# Patient Record
Sex: FEMALE | Race: WHITE | NOT HISPANIC OR LATINO | RURAL
[De-identification: names, ages, dates, MRNs, and addresses within clinical notes are randomized per-mention and may not be internally consistent; named-entity substitution may affect disease eponyms.]

---

## 2021-11-23 ENCOUNTER — OFFICE VISIT (OUTPATIENT)
Dept: RURAL CLINIC 6 | Facility: CLINIC | Age: 66
End: 2021-11-23
Payer: MEDICARE

## 2021-11-23 ENCOUNTER — WEB ENCOUNTER (OUTPATIENT)
Dept: RURAL CLINIC 6 | Facility: CLINIC | Age: 66
End: 2021-11-23

## 2021-11-23 DIAGNOSIS — Z80.0 FAMILY HISTORY OF COLON CANCER: ICD-10-CM

## 2021-11-23 PROCEDURE — 993 AGA: Performed by: INTERNAL MEDICINE

## 2021-11-23 RX ORDER — MONTELUKAST 10 MG/1
1 TABLET TABLET, FILM COATED ORAL ONCE A DAY
Status: ACTIVE | COMMUNITY

## 2021-11-23 RX ORDER — ZOLPIDEM TARTRATE 5 MG/1
1 TABLET AT BEDTIME TABLET, FILM COATED ORAL ONCE A DAY
Status: ACTIVE | COMMUNITY

## 2021-11-23 RX ORDER — ROSUVASTATIN CALCIUM 40 MG/1
1 TABLET TABLET, FILM COATED ORAL ONCE A DAY
Status: ACTIVE | COMMUNITY

## 2021-11-23 RX ORDER — ICOSAPENT ETHYL 500 MG/1
4 CAPSULES WITH MEALS CAPSULE ORAL TWICE A DAY
Status: ACTIVE | COMMUNITY

## 2021-11-23 RX ORDER — ESZOPICLONE 3 MG/1
1 TABLET IMMEDIATELY BEFORE BEDTIME TABLET, FILM COATED ORAL ONCE A DAY
Status: ACTIVE | COMMUNITY

## 2021-11-23 RX ORDER — CITALOPRAM HYDROBROMIDE 10 MG/1
1 TABLET TABLET, FILM COATED ORAL ONCE A DAY
Status: ACTIVE | COMMUNITY

## 2021-11-23 RX ORDER — HYDROCHLOROTHIAZIDE 12.5 MG/1
1 CAPSULE IN THE MORNING CAPSULE ORAL ONCE A DAY
Status: ACTIVE | COMMUNITY

## 2021-11-23 RX ORDER — SODIUM SULFATE, MAGNESIUM SULFATE, AND POTASSIUM CHLORIDE 17.75; 2.7; 2.25 G/1; G/1; G/1
12 TABLETS TABLET ORAL
Qty: 24 TABLETS | Refills: 0 | OUTPATIENT
Start: 2021-11-23 | End: 2021-11-24

## 2021-11-23 NOTE — HPI-TODAY'S VISIT:
66-year-old female with a positive family history of colon cancer (sister in her 50s) who has had several succesive negative colonoscopy over the years and is due for another.  She has no ongoing GI symptoms.  She has occasional heartburn on a PPI.  She has no dysphagia.  She has no cardiac or respiratory problems.

## 2021-12-22 PROBLEM — 312824007: Status: ACTIVE | Noted: 2021-12-22

## 2022-02-08 ENCOUNTER — OFFICE VISIT (OUTPATIENT)
Dept: RURAL MEDICAL CENTER 3 | Facility: MEDICAL CENTER | Age: 67
End: 2022-02-08
Payer: MEDICARE

## 2022-02-08 DIAGNOSIS — Z80.0 BROTHER AT YOUNG AGE FAMILY HISTORY OF COLON CANCER: ICD-10-CM

## 2022-02-08 DIAGNOSIS — K63.5 BENIGN COLON POLYP: ICD-10-CM

## 2022-02-08 PROCEDURE — 45385 COLONOSCOPY W/LESION REMOVAL: CPT | Performed by: INTERNAL MEDICINE

## 2022-07-28 ENCOUNTER — OFFICE VISIT (OUTPATIENT)
Dept: URBAN - METROPOLITAN AREA CLINIC 54 | Facility: CLINIC | Age: 67
End: 2022-07-28

## 2022-07-28 RX ORDER — ESZOPICLONE 3 MG/1
1 TABLET IMMEDIATELY BEFORE BEDTIME TABLET, FILM COATED ORAL ONCE A DAY
COMMUNITY

## 2022-07-28 RX ORDER — HYDROCHLOROTHIAZIDE 12.5 MG/1
1 CAPSULE IN THE MORNING CAPSULE ORAL ONCE A DAY
COMMUNITY

## 2022-07-28 RX ORDER — ZOLPIDEM TARTRATE 5 MG/1
1 TABLET AT BEDTIME TABLET, FILM COATED ORAL ONCE A DAY
COMMUNITY

## 2022-07-28 RX ORDER — ROSUVASTATIN CALCIUM 40 MG/1
1 TABLET TABLET, FILM COATED ORAL ONCE A DAY
COMMUNITY

## 2022-07-28 RX ORDER — CITALOPRAM HYDROBROMIDE 10 MG/1
1 TABLET TABLET, FILM COATED ORAL ONCE A DAY
COMMUNITY

## 2022-07-28 RX ORDER — ICOSAPENT ETHYL 500 MG/1
4 CAPSULES WITH MEALS CAPSULE ORAL TWICE A DAY
COMMUNITY

## 2022-07-28 RX ORDER — MONTELUKAST 10 MG/1
1 TABLET TABLET, FILM COATED ORAL ONCE A DAY
COMMUNITY

## 2022-07-29 ENCOUNTER — DASHBOARD ENCOUNTERS (OUTPATIENT)
Age: 67
End: 2022-07-29

## 2022-07-29 ENCOUNTER — OFFICE VISIT (OUTPATIENT)
Dept: URBAN - METROPOLITAN AREA CLINIC 54 | Facility: CLINIC | Age: 67
End: 2022-07-29
Payer: MEDICARE

## 2022-07-29 VITALS
BODY MASS INDEX: 27.23 KG/M2 | HEIGHT: 66 IN | SYSTOLIC BLOOD PRESSURE: 130 MMHG | HEART RATE: 71 BPM | WEIGHT: 169.4 LBS | TEMPERATURE: 97.3 F | DIASTOLIC BLOOD PRESSURE: 72 MMHG

## 2022-07-29 DIAGNOSIS — K21.9 GASTROESOPHAGEAL REFLUX DISEASE, UNSPECIFIED WHETHER ESOPHAGITIS PRESENT: ICD-10-CM

## 2022-07-29 PROCEDURE — 99214 OFFICE O/P EST MOD 30 MIN: CPT

## 2022-07-29 RX ORDER — ESTRADIOL 0.1 MG/G
AS DIRECTED CREAM VAGINAL
Status: ACTIVE | COMMUNITY

## 2022-07-29 RX ORDER — ZOLPIDEM TARTRATE 5 MG/1
1 TABLET AT BEDTIME TABLET, FILM COATED ORAL ONCE A DAY
Status: ACTIVE | COMMUNITY

## 2022-07-29 RX ORDER — ESZOPICLONE 3 MG/1
1 TABLET IMMEDIATELY BEFORE BEDTIME TABLET, FILM COATED ORAL ONCE A DAY
Status: ACTIVE | COMMUNITY

## 2022-07-29 RX ORDER — MONTELUKAST 10 MG/1
1 TABLET TABLET, FILM COATED ORAL ONCE A DAY
Status: ACTIVE | COMMUNITY

## 2022-07-29 RX ORDER — PANTOPRAZOLE SODIUM 40 MG/1
1 TABLET TABLET, DELAYED RELEASE ORAL TWICE A DAY
OUTPATIENT

## 2022-07-29 RX ORDER — PANTOPRAZOLE SODIUM 40 MG/1
1 TABLET TABLET, DELAYED RELEASE ORAL ONCE A DAY
Status: ACTIVE | COMMUNITY

## 2022-07-29 RX ORDER — HYDROCHLOROTHIAZIDE 12.5 MG/1
1 CAPSULE IN THE MORNING CAPSULE ORAL ONCE A DAY
Status: ACTIVE | COMMUNITY

## 2022-07-29 RX ORDER — CITALOPRAM HYDROBROMIDE 10 MG/1
1 TABLET TABLET, FILM COATED ORAL ONCE A DAY
Status: ACTIVE | COMMUNITY

## 2022-07-29 RX ORDER — ICOSAPENT ETHYL 500 MG/1
4 CAPSULES WITH MEALS CAPSULE ORAL TWICE A DAY
Status: ACTIVE | COMMUNITY

## 2022-07-29 RX ORDER — ASPIRIN 81 MG/1
1 TABLET TABLET, COATED ORAL ONCE A DAY
Status: ACTIVE | COMMUNITY

## 2022-07-29 RX ORDER — ROSUVASTATIN CALCIUM 40 MG/1
1 TABLET TABLET, FILM COATED ORAL ONCE A DAY
Status: ACTIVE | COMMUNITY

## 2022-07-29 RX ORDER — IBUPROFEN 200 MG
1 TABLET WITH MEALS CAPSULE ORAL ONCE A DAY
Status: ACTIVE | COMMUNITY

## 2022-07-29 NOTE — HPI-TODAY'S VISIT:
11/23/21: 66-year-old female with a positive family history of colon cancer (sister in her 50s) who has had several succesive negative colonoscopy over the years and is due for another.  She has no ongoing GI symptoms.  She has occasional heartburn on a PPI.  She has no dysphagia.  She has no cardiac or respiratory problems.  7/29/22: Patient was referred by AMBIKA Melissa for GERD. A copy of this document will be sent to the provider. Pt is a 67 yo female with PMH of HLD and GERD previously controlled on pantoprazole 40mg daily who presents for evaluation of worsening heartburn. Pt states she has been heaving heartburn after every meal for the last 3-4 months. No trouble at night. Denies dysphagia, odynophagia, n/v,  abdominal pain, change in BMs, melena, or hematochezia. PCP switched pt from omeprazole to pantoprazole. Pt does not take NSAIDs frequently. Pt does not drink etoh or use tobacco. Last EGD was 5-6 years ago in [place], unsure where. No family hx of UGI. No cardiopulmonary disease. Pt does not take blood thinners.   Cscope 2/8/2022: - Small sigmoid polyp removed with cold snare - Internal hemorrhoids - Otherwise normal examination to the cecum - Normal ileum Biopsy: Hyperplastic

## 2022-08-10 ENCOUNTER — TELEPHONE ENCOUNTER (OUTPATIENT)
Dept: URBAN - METROPOLITAN AREA CLINIC 54 | Facility: CLINIC | Age: 67
End: 2022-08-10

## 2022-08-11 ENCOUNTER — OFFICE VISIT (OUTPATIENT)
Dept: URBAN - METROPOLITAN AREA SURGERY CENTER 14 | Facility: SURGERY CENTER | Age: 67
End: 2022-08-11

## 2022-09-13 ENCOUNTER — OFFICE VISIT (OUTPATIENT)
Dept: RURAL MEDICAL CENTER 3 | Facility: MEDICAL CENTER | Age: 67
End: 2022-09-13

## 2022-11-08 ENCOUNTER — OFFICE VISIT (OUTPATIENT)
Dept: RURAL MEDICAL CENTER 3 | Facility: MEDICAL CENTER | Age: 67
End: 2022-11-08

## 2022-12-27 PROBLEM — 235595009: Status: ACTIVE | Noted: 2022-07-29

## 2023-01-10 ENCOUNTER — OFFICE VISIT (OUTPATIENT)
Dept: RURAL MEDICAL CENTER 3 | Facility: MEDICAL CENTER | Age: 68
End: 2023-01-10
Payer: MEDICARE

## 2023-01-10 DIAGNOSIS — Z83.79 FAMILY HISTORY OF ACUTE CHOLECYSTITIS: ICD-10-CM

## 2023-01-10 DIAGNOSIS — K44.9 DIAPHRAGMATIC HERNIA: ICD-10-CM

## 2023-01-10 DIAGNOSIS — K21.9 ACID REFLUX: ICD-10-CM

## 2023-01-10 PROCEDURE — 43235 EGD DIAGNOSTIC BRUSH WASH: CPT | Performed by: INTERNAL MEDICINE

## 2023-03-14 ENCOUNTER — OFFICE VISIT (OUTPATIENT)
Dept: RURAL MEDICAL CENTER 3 | Facility: MEDICAL CENTER | Age: 68
End: 2023-03-14

## 2025-02-26 ENCOUNTER — OFFICE VISIT (OUTPATIENT)
Dept: URBAN - METROPOLITAN AREA CLINIC 54 | Facility: CLINIC | Age: 70
End: 2025-02-26
Payer: MEDICARE

## 2025-02-26 VITALS
DIASTOLIC BLOOD PRESSURE: 70 MMHG | HEIGHT: 66 IN | BODY MASS INDEX: 27.26 KG/M2 | HEART RATE: 72 BPM | WEIGHT: 169.6 LBS | SYSTOLIC BLOOD PRESSURE: 109 MMHG | TEMPERATURE: 97.6 F

## 2025-02-26 DIAGNOSIS — R06.6 HICCUPS: ICD-10-CM

## 2025-02-26 DIAGNOSIS — K21.9 GASTROESOPHAGEAL REFLUX DISEASE, UNSPECIFIED WHETHER ESOPHAGITIS PRESENT: ICD-10-CM

## 2025-02-26 PROCEDURE — 99242 OFF/OP CONSLTJ NEW/EST SF 20: CPT | Performed by: PHYSICIAN ASSISTANT

## 2025-02-26 RX ORDER — ZOLPIDEM TARTRATE 5 MG/1
1 TABLET AT BEDTIME AS NEEDED TABLET, FILM COATED ORAL ONCE A DAY
Status: ACTIVE | COMMUNITY

## 2025-02-26 RX ORDER — TERBINAFINE HYDROCHLORIDE 250 MG/1
1 TABLET TABLET ORAL ONCE A DAY
Status: ACTIVE | COMMUNITY

## 2025-02-26 RX ORDER — AZELASTINE HYDROCHLORIDE 137 UG/1
2 PUFFS (1 SPRAY IN EACH NOSTRIL) SPRAY, METERED NASAL TWICE A DAY
Status: ACTIVE | COMMUNITY

## 2025-02-26 RX ORDER — SERTRALINE 100 MG/1
1 TABLET TABLET, FILM COATED ORAL ONCE A DAY
Status: ACTIVE | COMMUNITY

## 2025-02-26 RX ORDER — PANTOPRAZOLE SODIUM 40 MG/1
1 TABLET 1/2 TO 1 HOUR BEFORE MORNING MEAL TABLET, DELAYED RELEASE ORAL ONCE A DAY
Status: ACTIVE | COMMUNITY

## 2025-02-26 RX ORDER — MONTELUKAST 10 MG/1
1 TABLET TABLET, FILM COATED ORAL ONCE A DAY
Status: ACTIVE | COMMUNITY

## 2025-02-26 RX ORDER — TURMERIC/TURMERIC ROOT EXTRACT 450MG-50MG
AS DIRECTED CAPSULE ORAL
Status: ACTIVE | COMMUNITY

## 2025-02-26 RX ORDER — ALBUTEROL SULFATE 90 UG/1
1 PUFF AS NEEDED AEROSOL, METERED RESPIRATORY (INHALATION)
Status: ACTIVE | COMMUNITY

## 2025-02-26 RX ORDER — UBIDECARENONE 75 MG
AS DIRECTED CAPSULE ORAL
Status: ACTIVE | COMMUNITY

## 2025-02-26 RX ORDER — NIACIN 500 MG
1 TABLET WITH FOOD TABLET ORAL ONCE A DAY
Status: ACTIVE | COMMUNITY

## 2025-02-26 RX ORDER — ROSUVASTATIN CALCIUM 40 MG/1
1 TABLET TABLET, COATED ORAL ONCE A DAY
Status: ACTIVE | COMMUNITY

## 2025-02-26 RX ORDER — CHOLECALCIFEROL (VITAMIN D3) 50 MCG
1 TABLET TABLET ORAL ONCE A DAY
Status: ACTIVE | COMMUNITY

## 2025-02-26 RX ORDER — ESZOPICLONE 2 MG/1
1 TABLET IMMEDIATELY BEFORE BEDTIME TABLET, FILM COATED ORAL ONCE A DAY
Status: ACTIVE | COMMUNITY

## 2025-02-26 RX ORDER — IBUPROFEN 200 MG
1 TABLET WITH FOOD CAPSULE ORAL TWICE A DAY
Status: ACTIVE | COMMUNITY

## 2025-02-26 NOTE — HPI-TODAY'S VISIT:
11/23/21: 66-year-old female with a positive family history of colon cancer (sister in her 50s) who has had several succesive negative colonoscopy over the years and is due for another.  She has no ongoing GI symptoms.  She has occasional heartburn on a PPI.  She has no dysphagia.  She has no cardiac or respiratory problems.  7/29/22: Patient was referred by AMBIKA Melissa for GERD. A copy of this document will be sent to the provider. Pt is a 65 yo female with PMH of HLD and GERD previously controlled on pantoprazole 40mg daily who presents for evaluation of worsening heartburn. Pt states she has been heaving heartburn after every meal for the last 3-4 months. No trouble at night. Denies dysphagia, odynophagia, n/v,  abdominal pain, change in BMs, melena, or hematochezia. PCP switched pt from omeprazole to pantoprazole. Pt does not take NSAIDs frequently. Pt does not drink etoh or use tobacco. Last EGD was 5-6 years ago in [place], unsure where. No family hx of UGI. No cardiopulmonary disease. Pt does not take blood thinners.   Cscope 2/8/2022: - Small sigmoid polyp removed with cold snare - Internal hemorrhoids - Otherwise normal examination to the cecum - Normal ileum Biopsy: Hyperplastic  2/26/2025: Patient was referred to Dr. Robert Gomez by AMBIKA Melissa for GERD and hiccups.  A copy of this document will be sent to the referring provider.  Patient is mostly troubled by recurrent hiccups that can occur approximately 4 times daily.  Onset was approx 2 months ago. Patient states after discussing case with her PCP approx 1.5-2 weeks ago, hiccups have discontinued. GERD is controlled on pantoprazole 40 mg. No chest pain. EGD in January 2023 for GERD with Dr. Gomez showed a large hiatal hernia, Angel erosions, otherwise normal. Recent labs show unremarkable CBC and CMP.  Elevated triglycerides at 281.

## 2025-05-01 ENCOUNTER — OFFICE VISIT (OUTPATIENT)
Dept: RURAL CLINIC 6 | Facility: CLINIC | Age: 70
End: 2025-05-01

## 2025-05-01 RX ORDER — MONTELUKAST 10 MG/1
1 TABLET TABLET, FILM COATED ORAL ONCE A DAY
COMMUNITY

## 2025-05-01 RX ORDER — UBIDECARENONE 75 MG
AS DIRECTED CAPSULE ORAL
COMMUNITY

## 2025-05-01 RX ORDER — NIACIN 500 MG
1 TABLET WITH FOOD TABLET ORAL ONCE A DAY
COMMUNITY

## 2025-05-01 RX ORDER — ALBUTEROL SULFATE 90 UG/1
1 PUFF AS NEEDED AEROSOL, METERED RESPIRATORY (INHALATION)
COMMUNITY

## 2025-05-01 RX ORDER — ROSUVASTATIN CALCIUM 40 MG/1
1 TABLET TABLET, COATED ORAL ONCE A DAY
COMMUNITY

## 2025-05-01 RX ORDER — TERBINAFINE HYDROCHLORIDE 250 MG/1
1 TABLET TABLET ORAL ONCE A DAY
COMMUNITY

## 2025-05-01 RX ORDER — VITAMIN E MIXED 400 UNIT
1 TABLET CAPSULE ORAL ONCE A DAY
COMMUNITY

## 2025-05-01 RX ORDER — PANTOPRAZOLE SODIUM 40 MG/1
1 TABLET 1/2 TO 1 HOUR BEFORE MORNING MEAL TABLET, DELAYED RELEASE ORAL ONCE A DAY
COMMUNITY

## 2025-05-01 RX ORDER — ZOLPIDEM TARTRATE 5 MG/1
1 TABLET AT BEDTIME AS NEEDED TABLET, FILM COATED ORAL ONCE A DAY
COMMUNITY

## 2025-05-01 RX ORDER — TURMERIC/TURMERIC ROOT EXTRACT 450MG-50MG
AS DIRECTED CAPSULE ORAL
COMMUNITY

## 2025-05-01 RX ORDER — AZELASTINE HYDROCHLORIDE 137 UG/1
2 PUFFS (1 SPRAY IN EACH NOSTRIL) SPRAY, METERED NASAL TWICE A DAY
COMMUNITY

## 2025-05-01 RX ORDER — ESZOPICLONE 2 MG/1
1 TABLET IMMEDIATELY BEFORE BEDTIME TABLET, FILM COATED ORAL ONCE A DAY
COMMUNITY

## 2025-05-01 RX ORDER — SERTRALINE 100 MG/1
1 TABLET TABLET, FILM COATED ORAL ONCE A DAY
COMMUNITY